# Patient Record
Sex: MALE | Race: WHITE | URBAN - METROPOLITAN AREA
[De-identification: names, ages, dates, MRNs, and addresses within clinical notes are randomized per-mention and may not be internally consistent; named-entity substitution may affect disease eponyms.]

---

## 2024-06-20 ENCOUNTER — OFFICE VISIT (OUTPATIENT)
Dept: ORTHOPEDIC SURGERY | Age: 64
End: 2024-06-20

## 2024-06-20 VITALS — BODY MASS INDEX: 24.38 KG/M2 | HEIGHT: 72 IN | WEIGHT: 180 LBS

## 2024-06-20 DIAGNOSIS — M25.562 LEFT KNEE PAIN, UNSPECIFIED CHRONICITY: Primary | ICD-10-CM

## 2024-06-20 DIAGNOSIS — M17.12 PRIMARY OSTEOARTHRITIS OF LEFT KNEE: ICD-10-CM

## 2024-06-20 RX ORDER — METHYLPREDNISOLONE ACETATE 40 MG/ML
40 INJECTION, SUSPENSION INTRA-ARTICULAR; INTRALESIONAL; INTRAMUSCULAR; SOFT TISSUE ONCE
Status: COMPLETED | OUTPATIENT
Start: 2024-06-20 | End: 2024-06-20

## 2024-06-20 RX ADMIN — METHYLPREDNISOLONE ACETATE 40 MG: 40 INJECTION, SUSPENSION INTRA-ARTICULAR; INTRALESIONAL; INTRAMUSCULAR; SOFT TISSUE at 11:20

## 2024-06-20 NOTE — PROGRESS NOTES
Patient ID:  Rogerio Scott  [id number]  64 y.o.  1960    Today: June 20, 2024          Chief Complaint:  Left Knee pain    HPI:       Rogerio Scott is a 64 y.o. male seen for evaluation and treatment of left knee pain. Patient reports a longstanding history of pain involving the knee. The patient complains of knee pain with activities, reports pain as mostly occurring along the joint lines, reports stiffness of the knee with prolonged inactivity, and swelling/pain at the end of the day and after increased physical activity. Generally, symptoms improve with sitting/rest. The pain affects the patient’s activities of daily living and quality of life. Patient reports progressive pain and instability in the knee. The pain has been present for an extended period of time. Pain ranges from approximately 4-8 in a cyclical fashion with periods of acute exacerbation.   Injury history: No   Patient has attempted prior conservative treatment including medications and activity modification.  Treatment to date has included Over-the-Counter medications including NSAID and/or Tylenol and Activity Modifications with some improvement but the patient is still reporting frequent pain causing difficulty with ADLs despite treatment.       Past Medical History:  No past medical history on file.    Past Surgical History:  No past surgical history on file.     Medications:     Prior to Admission medications    Not on File       Family History:   No family history on file.    Social History:      Social History     Tobacco Use    Smoking status: Not on file    Smokeless tobacco: Not on file   Substance Use Topics    Alcohol use: Not on file           Allergies:    Not on File     Vitals:   Ht 1.829 m (6')   Wt 81.6 kg (180 lb)   BMI 24.41 kg/m²     ROS:   Review of Systems         Objective:   General: Patient is awake and in no acute distress  Psych: Mood and affect appropriate  HEENT: Normocephalic. Atramatic. Pupils equal, round

## 2024-10-24 ENCOUNTER — OFFICE VISIT (OUTPATIENT)
Dept: ORTHOPEDIC SURGERY | Age: 64
End: 2024-10-24

## 2024-10-24 DIAGNOSIS — M25.562 LEFT KNEE PAIN, UNSPECIFIED CHRONICITY: Primary | ICD-10-CM

## 2024-10-24 RX ORDER — METHYLPREDNISOLONE ACETATE 40 MG/ML
40 INJECTION, SUSPENSION INTRA-ARTICULAR; INTRALESIONAL; INTRAMUSCULAR; SOFT TISSUE ONCE
Qty: 1 ML | Refills: 0
Start: 2024-10-24 | End: 2024-10-24

## 2024-10-24 NOTE — PROGRESS NOTES
Patient ID:  Rogerio Scott  [id number]  64 y.o.  1960    Today: October 24, 2024          Chief Complaint: Left Knee pain    HPI:       Rogerio Scott is a 64 y.o. male seen for evaluation and treatment of their knee pain. Patient reports a longstanding history of pain involving the knee. The patient complains of knee pain with activities, reports pain as mostly occurring along the joint lines, reports stiffness of the knee with prolonged inactivity, and swelling/pain at the end of the day and after increased physical activity. The patient's knee pain cause moderate to severe limitations in the activities of rising from bed, putting on socks/shoes, rising from a sitting position, bending towards the floor and kneeling, twisting and pivoting on the limb and squatting. In addition, at times the patient reports extreme difficulty with these activities. Generally, symptoms improve with sitting/rest. The pain affects the patient’s activities of daily living and quality of life. Patient reports progressive pain and instability in the knee. The pain has been present for an extended period of time. Pain ranges from approximately 4-8 in a cyclical fashion with periods of acute exacerbation.    Prior procedures on the knee include none.    Patient has attempted prior conservative treatment including medications and activity modification.    Treatment to date has included oral medications inclusive of over-the-counter medications (NSAIDS and/or Tylenol) +/-prescription medications, activity modification, weight loss (if applicable), and injections which include corticosteroids and/or viscosupplimentation.    Past Medical History:  No past medical history on file.    Past Surgical History:  No past surgical history on file.     Medications:     Prior to Admission medications    Not on File       Family History:   No family history on file.    Social History:      Social History     Tobacco Use    Smoking status: Not on

## 2024-11-04 ENCOUNTER — TELEPHONE (OUTPATIENT)
Dept: ORTHOPEDIC SURGERY | Age: 64
End: 2024-11-04

## 2024-11-04 DIAGNOSIS — M17.12 PRIMARY OSTEOARTHRITIS OF LEFT KNEE: Primary | ICD-10-CM

## 2024-11-04 DIAGNOSIS — M21.962 ACQUIRED DEFORMITY OF LEFT KNEE: ICD-10-CM

## 2024-11-04 NOTE — TELEPHONE ENCOUNTER
[facility] is calling because he was already cleared for surgery and they have refaxed the letter to us. If you don't receive it please call and let them know.

## 2024-11-13 DIAGNOSIS — Z01.818 PRE-OP EVALUATION: Primary | ICD-10-CM

## 2024-11-14 ENCOUNTER — TELEPHONE (OUTPATIENT)
Dept: ORTHOPEDIC SURGERY | Age: 64
End: 2024-11-14

## 2024-11-14 NOTE — TELEPHONE ENCOUNTER
Patient has questions about pre surg apts like labwork and CT and location of apts, lives in [place] not familiar with [facility] too much

## 2024-11-14 NOTE — TELEPHONE ENCOUNTER
Please  call  or  text again  to pts  number     [phone]    The  earlier  text from you went  to his wifes  phone.    He  called  back and  we  tried  to call the [facility] and  your  desk   Please  try  him again    hard copy

## 2024-11-15 NOTE — TELEPHONE ENCOUNTER
I called and spoke to patient. I let the patient know that the notification from Rogerio was Rogerio putting lab orders in for his upcoming surgery. [facility].

## 2024-12-06 ENCOUNTER — TELEPHONE (OUTPATIENT)
Dept: ORTHOPEDIC SURGERY | Age: 64
End: 2024-12-06

## 2024-12-06 NOTE — TELEPHONE ENCOUNTER
Patient need to change date of pre op office apt 1/30 to either 1/24 or 1/27 just no early time due to living in [place], wife has to work 1/30 and wants to come with him

## 2025-01-17 ENCOUNTER — TELEPHONE (OUTPATIENT)
Dept: ORTHOPEDIC SURGERY | Age: 65
End: 2025-01-17

## 2025-01-19 DIAGNOSIS — G89.18 POSTOPERATIVE PAIN: Primary | ICD-10-CM

## 2025-01-21 RX ORDER — ASPIRIN 81 MG/1
81 TABLET ORAL 2 TIMES DAILY
Qty: 60 TABLET | Refills: 0 | Status: SHIPPED | OUTPATIENT
Start: 2025-01-21

## 2025-01-21 RX ORDER — OMEPRAZOLE 40 MG/1
40 CAPSULE, DELAYED RELEASE ORAL DAILY
Qty: 30 CAPSULE | Refills: 0 | Status: SHIPPED | OUTPATIENT
Start: 2025-01-21

## 2025-01-21 RX ORDER — ONDANSETRON 4 MG/1
4 TABLET, FILM COATED ORAL 3 TIMES DAILY PRN
Qty: 30 TABLET | Refills: 1 | Status: SHIPPED | OUTPATIENT
Start: 2025-01-21

## 2025-01-21 RX ORDER — ZOLPIDEM TARTRATE 5 MG/1
5 TABLET ORAL NIGHTLY PRN
Qty: 60 TABLET | Refills: 0 | Status: SHIPPED | OUTPATIENT
Start: 2025-01-21 | End: 2025-03-22

## 2025-01-21 RX ORDER — GABAPENTIN 100 MG/1
100 CAPSULE ORAL 2 TIMES DAILY
Qty: 30 CAPSULE | Refills: 0 | Status: SHIPPED | OUTPATIENT
Start: 2025-01-21 | End: 2025-02-05

## 2025-01-21 RX ORDER — ACETAMINOPHEN 500 MG
1000 TABLET ORAL EVERY 6 HOURS PRN
Qty: 180 TABLET | Refills: 2 | Status: SHIPPED | OUTPATIENT
Start: 2025-01-21

## 2025-01-21 RX ORDER — SENNOSIDES A AND B 8.6 MG/1
1 TABLET, FILM COATED ORAL 2 TIMES DAILY
Qty: 30 TABLET | Refills: 2 | Status: SHIPPED | OUTPATIENT
Start: 2025-01-21

## 2025-01-21 RX ORDER — OXYCODONE HYDROCHLORIDE 5 MG/1
10 TABLET ORAL EVERY 4 HOURS PRN
Qty: 60 TABLET | Refills: 0 | Status: SHIPPED | OUTPATIENT
Start: 2025-01-21 | End: 2025-01-28

## 2025-01-21 RX ORDER — MELOXICAM 15 MG/1
15 TABLET ORAL DAILY
Qty: 30 TABLET | Refills: 2 | Status: SHIPPED | OUTPATIENT
Start: 2025-01-21

## 2025-01-23 ENCOUNTER — TELEPHONE (OUTPATIENT)
Dept: ORTHOPEDIC SURGERY | Age: 65
End: 2025-01-23

## 2025-01-23 DIAGNOSIS — Z96.652 STATUS POST LEFT KNEE REPLACEMENT: Primary | ICD-10-CM

## 2025-01-23 NOTE — TELEPHONE ENCOUNTER
Helen with homecare is calling to let you know they have to decline the referral for PT because the patient lives in [place], [place]

## 2025-01-24 DIAGNOSIS — Z01.818 PRE-OP EVALUATION: ICD-10-CM

## 2025-01-24 LAB
ALBUMIN SERPL-MCNC: 3.8 G/DL (ref 3.2–4.6)
ALBUMIN/GLOB SERPL: 1.2 (ref 1–1.9)
ALP SERPL-CCNC: 111 U/L (ref 40–129)
ALT SERPL-CCNC: 31 U/L (ref 8–55)
ANION GAP SERPL CALC-SCNC: 12 MMOL/L (ref 7–16)
AST SERPL-CCNC: 23 U/L (ref 15–37)
BASOPHILS # BLD: 0.11 K/UL (ref 0–0.2)
BASOPHILS NFR BLD: 2.3 % (ref 0–2)
BILIRUB SERPL-MCNC: 0.4 MG/DL (ref 0–1.2)
BUN SERPL-MCNC: 21 MG/DL (ref 8–23)
CALCIUM SERPL-MCNC: 9.6 MG/DL (ref 8.8–10.2)
CHLORIDE SERPL-SCNC: 105 MMOL/L (ref 98–107)
CO2 SERPL-SCNC: 25 MMOL/L (ref 20–29)
CREAT SERPL-MCNC: 0.83 MG/DL (ref 0.8–1.3)
DIFFERENTIAL METHOD BLD: ABNORMAL
EOSINOPHIL # BLD: 0.3 K/UL (ref 0–0.8)
EOSINOPHIL NFR BLD: 6.2 % (ref 0.5–7.8)
ERYTHROCYTE [DISTWIDTH] IN BLOOD BY AUTOMATED COUNT: 12.8 % (ref 11.9–14.6)
EST. AVERAGE GLUCOSE BLD GHB EST-MCNC: 122 MG/DL
GLOBULIN SER CALC-MCNC: 3.3 G/DL (ref 2.3–3.5)
GLUCOSE SERPL-MCNC: 107 MG/DL (ref 70–99)
HBA1C MFR BLD: 5.9 % (ref 0–5.6)
HCT VFR BLD AUTO: 40 % (ref 41.1–50.3)
HGB BLD-MCNC: 13.1 G/DL (ref 13.6–17.2)
IMM GRANULOCYTES # BLD AUTO: 0.02 K/UL (ref 0–0.5)
IMM GRANULOCYTES NFR BLD AUTO: 0.4 % (ref 0–5)
INR PPP: 1
LYMPHOCYTES # BLD: 0.91 K/UL (ref 0.5–4.6)
LYMPHOCYTES NFR BLD: 18.8 % (ref 13–44)
MCH RBC QN AUTO: 32.3 PG (ref 26.1–32.9)
MCHC RBC AUTO-ENTMCNC: 32.8 G/DL (ref 31.4–35)
MCV RBC AUTO: 98.5 FL (ref 82–102)
MONOCYTES # BLD: 0.65 K/UL (ref 0.1–1.3)
MONOCYTES NFR BLD: 13.5 % (ref 4–12)
NEUTS SEG # BLD: 2.84 K/UL (ref 1.7–8.2)
NEUTS SEG NFR BLD: 58.8 % (ref 43–78)
NRBC # BLD: 0 K/UL (ref 0–0.2)
PLATELET # BLD AUTO: 236 K/UL (ref 150–450)
PMV BLD AUTO: 10.8 FL (ref 9.4–12.3)
POTASSIUM SERPL-SCNC: 4.3 MMOL/L (ref 3.5–5.1)
PROT SERPL-MCNC: 7.1 G/DL (ref 6.3–8.2)
PROTHROMBIN TIME: 12.8 SEC (ref 11.3–14.9)
RBC # BLD AUTO: 4.06 M/UL (ref 4.23–5.6)
SODIUM SERPL-SCNC: 142 MMOL/L (ref 136–145)
WBC # BLD AUTO: 4.8 K/UL (ref 4.3–11.1)

## 2025-01-25 LAB
COMMENT:: NORMAL
COTININE UR QL SCN: NEGATIVE NG/ML

## 2025-01-28 ENCOUNTER — OFFICE VISIT (OUTPATIENT)
Dept: ORTHOPEDIC SURGERY | Age: 65
End: 2025-01-28

## 2025-01-28 DIAGNOSIS — Z01.818 PRE-OP EVALUATION: Primary | ICD-10-CM

## 2025-01-28 NOTE — PROGRESS NOTES
Patient ID:  Rogerio Scott  [id number]  64 y.o.  1960    Today: January 28, 2025          CC:  Left  Knee pain    HPI:   The patient has end stage arthritis of the left knee. The patient was evaluated and examined during consultation prior to today. The patient complains of knee pain with activities, reports pain as mostly occurring along the joint lines, reports stiffness of the knee after inactivity, and swelling/pain at the end of the day and after increased physical activity. The pain affects the patient’s activities of daily living and quality of life. The patient has attempted and exhausted conservative treatment. There have been no changes to the patient's orthopedic condition since the last office visit.    Past Medical History:  No past medical history on file.    Past Surgical History:  No past surgical history on file.     Medications:     Prior to Admission medications    Medication Sig Start Date End Date Taking? Authorizing Provider   acetaminophen (TYLENOL) 500 MG tablet Take 2 tablets by mouth every 6 hours as needed for Pain 1/21/25   Ramón Montana MD   zolpidem (AMBIEN) 5 MG tablet Take 1 tablet by mouth nightly as needed for Sleep for up to 60 days. Max Daily Amount: 5 mg 1/21/25 3/22/25  Ramón Montana MD   aspirin (ASPIRIN 81) 81 MG EC tablet Take 1 tablet by mouth in the morning and at bedtime Take one tablet morning and evening 1/21/25   Ramón Montana MD   gabapentin (NEURONTIN) 100 MG capsule Take 1 capsule by mouth 2 times daily for 15 days. 1/21/25 2/5/25  Ramón Montana MD   meloxicam (MOBIC) 15 MG tablet Take 1 tablet by mouth daily 1/21/25   Ramón Montana MD   omeprazole (PRILOSEC) 40 MG delayed release capsule Take 1 capsule by mouth daily 1/21/25   Ramón Montana MD   ondansetron (ZOFRAN) 4 MG tablet Take 1 tablet by mouth 3 times daily as needed for Nausea or Vomiting 1/21/25   Ramón Montana MD   oxyCODONE (ROXICODONE) 5 MG immediate release tablet Take 2

## 2025-01-31 ENCOUNTER — TELEPHONE (OUTPATIENT)
Dept: ORTHOPEDIC SURGERY | Age: 65
End: 2025-01-31

## 2025-01-31 ENCOUNTER — OUTSIDE SERVICES (OUTPATIENT)
Dept: ORTHOPEDIC SURGERY | Age: 65
End: 2025-01-31

## 2025-01-31 DIAGNOSIS — M17.12 PRIMARY OSTEOARTHRITIS OF LEFT KNEE: ICD-10-CM

## 2025-01-31 DIAGNOSIS — Z96.641 STATUS POST RIGHT HIP REPLACEMENT: Primary | ICD-10-CM

## 2025-01-31 NOTE — TELEPHONE ENCOUNTER
She left a voicemail stating she got a referral on the 23rd and pt was supposed to have surgery today. She says she needs a lot more info than what she has received.

## 2025-01-31 NOTE — TELEPHONE ENCOUNTER
I called and spoke with Bethany at [facility] and last office visit note was refaxed to [phone].   DC instructions

## 2025-01-31 NOTE — OP NOTE
any debride and the final patellar component was inserted which was a tritanium-backed cementless component.     At this point the trial polyethylene component and trial femoral component were removed. We again assessed our tibial tray and verified that we were satisfied with its position. We did not have to adjust its position. The proximal tibia was punched and drilled per protocol for the system. We irrigated and debrided all bony surfaces of residual tissue and bone. We then inserted the tibial and femoral components and noted them to be well seated. We then inserted our final polyethylene component which was a 12mm thick.    Rogerio Scott's knee was placed through a range of motion and noted to be stable as mentioned above with the trial components. In additional we checked patellar tracking one last time which was felt to be appropriate.    A lavage of  diluted betadine solution of 17.5 ml Betadine in 500 of 0.9% normal saline was allowed to soak in the wound for 3 minutes was allowed to soak in the wound after implanting of the prosthesis. During this time we removed the previously placed femoral and tibial sensors and array pins and finished injection our periarticular cocktail. The wound was irrigated with normal saline again before closure.      The capsular layer was closed using a combination of 0-Stratafix bidirectional barbed suture and #2 Fiberwire. The subcutaneous layer was closed using a 2-0 Monocril interrupted suture. The skin was closed using staples. A sterile Aquacel dressing was applied. The sponge count and needle counts were correct. Patient was transferred to the hospital stretcher and transported to recovery in stable condition.    Signed By: Ramón Montana MD

## 2025-02-06 DIAGNOSIS — Z96.641 STATUS POST RIGHT HIP REPLACEMENT: Primary | ICD-10-CM

## 2025-02-06 RX ORDER — OXYCODONE HYDROCHLORIDE 5 MG/1
5 TABLET ORAL EVERY 6 HOURS PRN
Qty: 28 TABLET | Refills: 0 | Status: SHIPPED | OUTPATIENT
Start: 2025-02-06 | End: 2025-02-13

## 2025-02-12 RX ORDER — OMEPRAZOLE 40 MG/1
CAPSULE, DELAYED RELEASE ORAL DAILY
Qty: 90 CAPSULE | Refills: 1 | OUTPATIENT
Start: 2025-02-12

## 2025-02-12 RX ORDER — SALICYLIC ACID 40 %
81 ADHESIVE PATCH, MEDICATED TOPICAL 2 TIMES DAILY
Qty: 180 TABLET | Refills: 1 | OUTPATIENT
Start: 2025-02-12

## 2025-02-13 ENCOUNTER — TELEPHONE (OUTPATIENT)
Dept: ORTHOPEDIC SURGERY | Age: 65
End: 2025-02-13

## 2025-02-13 DIAGNOSIS — Z96.641 STATUS POST RIGHT HIP REPLACEMENT: Primary | ICD-10-CM

## 2025-02-13 NOTE — TELEPHONE ENCOUNTER
He is requesting a refill on Tizanidine to [facility] in [place] on [address].

## 2025-02-19 ENCOUNTER — OFFICE VISIT (OUTPATIENT)
Dept: ORTHOPEDIC SURGERY | Age: 65
End: 2025-02-19

## 2025-02-19 DIAGNOSIS — Z96.652 STATUS POST LEFT KNEE REPLACEMENT: Primary | ICD-10-CM

## 2025-02-19 DIAGNOSIS — Z09 FOLLOW-UP EXAMINATION FOLLOWING SURGERY: ICD-10-CM

## 2025-02-19 PROCEDURE — 99024 POSTOP FOLLOW-UP VISIT: CPT | Performed by: NURSE PRACTITIONER

## 2025-02-19 RX ORDER — AMOXICILLIN 500 MG/1
TABLET, FILM COATED ORAL
Qty: 12 TABLET | Refills: 1 | Status: SHIPPED | OUTPATIENT
Start: 2025-02-19

## 2025-02-19 RX ORDER — OXYCODONE HYDROCHLORIDE 5 MG/1
5 TABLET ORAL EVERY 4 HOURS PRN
Qty: 30 TABLET | Refills: 0 | Status: SHIPPED | OUTPATIENT
Start: 2025-02-19 | End: 2025-02-24

## 2025-02-19 NOTE — PROGRESS NOTES
Patient ID:  Ronnie Scott  [id number]  65 y.o.  1960    Today: February 19, 2025    CHIEF COMPLAINT:  Follow-up left total knee replacement    HISTORY:  The patient presents today for 3-week follow-up after knee replacement.  The patient is doing very well, is on aspirin for DVT prophylaxis.  The patient is working with physical therapy to regain some strength and motion.  Continues to take medication appropriately.  The patient has done a good job keeping dressing/wound clean and dry.  The patient is progressing nicely postoperatively.    PE:  Incision is examined which is well healed.  No erythema, induration or drainage. No significant fluid accumulation around the surgical site.  ROM is 0 to 90 degrees.  Overall the knee appears stable to varus/valgus stress throughout arc of motion.  Anterior drawer testing at 45 and 90º of flexion is stable.  Posterior drawer testing is stable.  Distally able to plantar and dorsiflex foot and ankle.  Sensation intact.  Limb is perfused.  No sign of DVT.    X-RAYS:    XR LT Knee 2/3 view  Views Obtained: AP, Lateral and Sunrise views of the left knee  Indication: Postop Left TKA  Findings: All hardware to be intact.  All the components appear to be well sized without any evidence of loosening.  Overall mechanical alignment appears to be within acceptable criteria. No evidence of fracture.  Patella appears to be tracking appropriately within the trochlear groove.  Impression: Normal Xray after left total knee replacement    RONNIE GR, APRN - CNP    ASSESSMENT:  3 Weeks S/P Left Total Knee Replacement    PLAN:  Continue activity and weight bearing as tolerated.  Continue PT/OT to focus on strengthening and stretching.  Continue to take pain medication appropriately.  The patient will continue to take aspirin for another week for a full month of DVT prophylaxis.  The patient is given a script for antibiotics to be taken before dental prophylaxis today.  Will

## 2025-02-20 ENCOUNTER — TELEPHONE (OUTPATIENT)
Dept: ORTHOPEDIC SURGERY | Age: 65
End: 2025-02-20

## 2025-02-20 DIAGNOSIS — Z96.652 STATUS POST LEFT KNEE REPLACEMENT: Primary | ICD-10-CM

## 2025-02-20 RX ORDER — GABAPENTIN 100 MG/1
100 CAPSULE ORAL 2 TIMES DAILY
Qty: 30 CAPSULE | Refills: 0 | Status: SHIPPED | OUTPATIENT
Start: 2025-02-20 | End: 2025-03-07

## 2025-02-20 NOTE — TELEPHONE ENCOUNTER
He is out of gabapentin. He wants to know if he should continue this. He says it does help his incision. Can you refill it to [facility] in [place] on [address]? If he don't need it let him know. He forgot to mention yesterday.

## 2025-04-14 RX ORDER — MELOXICAM 15 MG/1
15 TABLET ORAL DAILY
Qty: 30 TABLET | Refills: 2 | OUTPATIENT
Start: 2025-04-14

## 2025-05-21 ENCOUNTER — TELEPHONE (OUTPATIENT)
Dept: ORTHOPEDIC SURGERY | Age: 65
End: 2025-05-21

## 2025-05-21 ENCOUNTER — OFFICE VISIT (OUTPATIENT)
Dept: ORTHOPEDIC SURGERY | Age: 65
End: 2025-05-21
Payer: MEDICARE

## 2025-05-21 DIAGNOSIS — Z09 FOLLOW-UP EXAMINATION FOLLOWING SURGERY: Primary | ICD-10-CM

## 2025-05-21 PROCEDURE — G8428 CUR MEDS NOT DOCUMENT: HCPCS | Performed by: NURSE PRACTITIONER

## 2025-05-21 PROCEDURE — 99213 OFFICE O/P EST LOW 20 MIN: CPT | Performed by: NURSE PRACTITIONER

## 2025-05-21 PROCEDURE — 4004F PT TOBACCO SCREEN RCVD TLK: CPT | Performed by: NURSE PRACTITIONER

## 2025-05-21 PROCEDURE — 1123F ACP DISCUSS/DSCN MKR DOCD: CPT | Performed by: NURSE PRACTITIONER

## 2025-05-21 PROCEDURE — 3017F COLORECTAL CA SCREEN DOC REV: CPT | Performed by: NURSE PRACTITIONER

## 2025-05-21 PROCEDURE — G8420 CALC BMI NORM PARAMETERS: HCPCS | Performed by: NURSE PRACTITIONER

## 2025-05-21 NOTE — TELEPHONE ENCOUNTER
He just saw Rogerio and forgot to ask if he can swim in the lake, pool or ocean. Please let him know.

## 2025-05-21 NOTE — PROGRESS NOTES
Patient ID:  Rogerio Scott  [id number]  65 y.o.  1960    Today: May 21, 2025    CHIEF COMPLAINT:  Follow-up left total knee replacement    HISTORY:  The patient presents today approximately 4 months after knee replacement.  The patient is doing very well.  He walks approximately 6 miles/day. Reports some stiffness medial sided knee pain mostly in the am. The patient is able to perform most if not all required and desired activities. Is no longer taking higher dose pain medications and is only requiring occasional over-the-counter anti-inflammatories and/or Tylenol. Continues to work on stretching and strengthening of the limb.    Past Medical History:  No past medical history on file.    Past Surgical History:  No past surgical history on file.     Medications:     Prior to Admission medications    Medication Sig Start Date End Date Taking? Authorizing Provider   gabapentin (NEURONTIN) 100 MG capsule Take 1 capsule by mouth 2 times daily for 15 days. 2/20/25 3/7/25  Rogerio King APRN - CNP   amoxicillin (AMOXIL) 500 MG tablet Take 4 tablets by mouth 1 hour prior to any dental procedure. 2/19/25   Rogerio King APRN - CNP   tiZANidine (ZANAFLEX) 4 MG tablet Take 1 tablet by mouth every 8 hours as needed (spasm) 2/14/25   Rogerio King APRN - CNP   acetaminophen (TYLENOL) 500 MG tablet Take 2 tablets by mouth every 6 hours as needed for Pain 1/21/25   Ramón Montana MD   aspirin (ASPIRIN 81) 81 MG EC tablet Take 1 tablet by mouth in the morning and at bedtime Take one tablet morning and evening 1/21/25   Ramón Montana MD   gabapentin (NEURONTIN) 100 MG capsule Take 1 capsule by mouth 2 times daily for 15 days. 1/21/25 2/5/25  Ramón Montana MD   meloxicam (MOBIC) 15 MG tablet Take 1 tablet by mouth daily 1/21/25   Ramón Montana MD   omeprazole (PRILOSEC) 40 MG delayed release capsule Take 1 capsule by mouth daily 1/21/25   Ramón Montana MD   ondansetron (ZOFRAN) 4 MG tablet Take